# Patient Record
Sex: MALE | Race: WHITE | NOT HISPANIC OR LATINO | URBAN - METROPOLITAN AREA
[De-identification: names, ages, dates, MRNs, and addresses within clinical notes are randomized per-mention and may not be internally consistent; named-entity substitution may affect disease eponyms.]

---

## 2018-04-25 ENCOUNTER — TRANSFERRED RECORDS (OUTPATIENT)
Dept: HEALTH INFORMATION MANAGEMENT | Facility: CLINIC | Age: 23
End: 2018-04-25

## 2018-04-25 ENCOUNTER — HOSPITAL ENCOUNTER (EMERGENCY)
Facility: CLINIC | Age: 23
Discharge: HOME | End: 2018-04-25
Attending: EMERGENCY MEDICINE | Admitting: EMERGENCY MEDICINE
Payer: COMMERCIAL

## 2018-04-25 VITALS
OXYGEN SATURATION: 97 % | WEIGHT: 138 LBS | BODY MASS INDEX: 20.44 KG/M2 | SYSTOLIC BLOOD PRESSURE: 144 MMHG | RESPIRATION RATE: 16 BRPM | HEART RATE: 64 BPM | HEIGHT: 69 IN | TEMPERATURE: 97.8 F | DIASTOLIC BLOOD PRESSURE: 95 MMHG

## 2018-04-25 DIAGNOSIS — I82.4Z1 ACUTE DEEP VEIN THROMBOSIS (DVT) OF DISTAL VEIN OF RIGHT LOWER EXTREMITY (H): ICD-10-CM

## 2018-04-25 LAB
ANION GAP SERPL CALCULATED.3IONS-SCNC: 4 MMOL/L (ref 3–14)
APTT PPP: 26 SEC (ref 22–37)
BASOPHILS # BLD AUTO: 0 10E9/L (ref 0–0.2)
BASOPHILS NFR BLD AUTO: 0.5 %
BUN SERPL-MCNC: 13 MG/DL (ref 7–30)
CALCIUM SERPL-MCNC: 9.1 MG/DL (ref 8.5–10.1)
CHLORIDE SERPL-SCNC: 107 MMOL/L (ref 94–109)
CO2 SERPL-SCNC: 28 MMOL/L (ref 20–32)
CREAT SERPL-MCNC: 0.99 MG/DL (ref 0.66–1.25)
DIFFERENTIAL METHOD BLD: NORMAL
EOSINOPHIL # BLD AUTO: 0.5 10E9/L (ref 0–0.7)
EOSINOPHIL NFR BLD AUTO: 7.5 %
ERYTHROCYTE [DISTWIDTH] IN BLOOD BY AUTOMATED COUNT: 12 % (ref 10–15)
GFR SERPL CREATININE-BSD FRML MDRD: >90 ML/MIN/1.7M2
GLUCOSE SERPL-MCNC: 84 MG/DL (ref 70–99)
HCT VFR BLD AUTO: 42.3 % (ref 40–53)
HGB BLD-MCNC: 15.2 G/DL (ref 13.3–17.7)
IMM GRANULOCYTES # BLD: 0 10E9/L (ref 0–0.4)
IMM GRANULOCYTES NFR BLD: 0.2 %
INR PPP: 1.08 (ref 0.86–1.14)
LYMPHOCYTES # BLD AUTO: 1.8 10E9/L (ref 0.8–5.3)
LYMPHOCYTES NFR BLD AUTO: 29.6 %
MCH RBC QN AUTO: 30.9 PG (ref 26.5–33)
MCHC RBC AUTO-ENTMCNC: 35.9 G/DL (ref 31.5–36.5)
MCV RBC AUTO: 86 FL (ref 78–100)
MONOCYTES # BLD AUTO: 0.3 10E9/L (ref 0–1.3)
MONOCYTES NFR BLD AUTO: 5.7 %
NEUTROPHILS # BLD AUTO: 3.4 10E9/L (ref 1.6–8.3)
NEUTROPHILS NFR BLD AUTO: 56.5 %
NRBC # BLD AUTO: 0 10*3/UL
NRBC BLD AUTO-RTO: 0 /100
PLATELET # BLD AUTO: 246 10E9/L (ref 150–450)
POTASSIUM SERPL-SCNC: 4.1 MMOL/L (ref 3.4–5.3)
RBC # BLD AUTO: 4.92 10E12/L (ref 4.4–5.9)
SODIUM SERPL-SCNC: 139 MMOL/L (ref 133–144)
WBC # BLD AUTO: 6 10E9/L (ref 4–11)

## 2018-04-25 PROCEDURE — 99283 EMERGENCY DEPT VISIT LOW MDM: CPT

## 2018-04-25 PROCEDURE — 85730 THROMBOPLASTIN TIME PARTIAL: CPT | Performed by: EMERGENCY MEDICINE

## 2018-04-25 PROCEDURE — 85610 PROTHROMBIN TIME: CPT | Performed by: EMERGENCY MEDICINE

## 2018-04-25 PROCEDURE — 85025 COMPLETE CBC W/AUTO DIFF WBC: CPT | Performed by: EMERGENCY MEDICINE

## 2018-04-25 PROCEDURE — 80048 BASIC METABOLIC PNL TOTAL CA: CPT | Performed by: EMERGENCY MEDICINE

## 2018-04-25 ASSESSMENT — ENCOUNTER SYMPTOMS: SHORTNESS OF BREATH: 0

## 2018-04-25 NOTE — ED AVS SNAPSHOT
Emergency Department    [address] [place] [address]    Phone:  [phone]    Fax:  [phone]                                       Otis Brewster   MRN: [id number]    Department:   Emergency Department   Date of Visit:  4/25/2018           After Visit Summary Signature Page     I have received my discharge instructions, and my questions have been answered. I have discussed any challenges I see with this plan with the nurse or doctor.    ..........................................................................................................................................  Patient/Patient Representative Signature      ..........................................................................................................................................  Patient Representative Print Name and Relationship to Patient    ..................................................               ................................................  Date                                            Time    ..........................................................................................................................................  Reviewed by Signature/Title    ...................................................              ..............................................  Date                                                            Time

## 2018-04-25 NOTE — DISCHARGE INSTRUCTIONS
* Deep Vein Thrombosis    Deep Vein Thrombosis (DVT) means there is a blood clot in a deep vein of the leg. This may cause redness, swelling, warmth and pain of the leg. If the blood clot grows larger, a piece may break off and go to the lungs (pulmonary embolus)  Factors that increase risk of a DVT include: overweight, smoking, use of estrogen replacement therapy. Prolonged periods without movement (such as long distance travel, wearing a fracture cast, and prolonged bed rest after surgery or during an illness) also increases the risk of a DVT.  Home Care:    Wear compression stockings as directed by your doctor.    Move your ankles, toes and knees frequently to stimulate blood flow.    You will be prescribed a blood-thinning (anti-coagulant) medicine, either pills or shots. Take it exactly as directed.  Follow Up  Follow up with your doctor as advised. You will need regular blood tests to check your INR (International Normalized Ratio).  Get Prompt Medical Attention  Call your doctor or 911 if any of the following occur:    Shortness of breath or painful breathing    Chest pain, repeated cough or coughing up blood    Increasing swelling or increasing pain in the leg    Spreading redness    Unexpected bleeding (nose, gums, cuts, urinary tract, vagina, rectum)    6491-0607 [employer]. [address] [place] [address], [place] [address]. All rights reserved. This information is not intended as a substitute for professional medical care. Always follow your healthcare professional's instructions.  This information has been modified by your health care provider with permission from the publisher.

## 2018-04-25 NOTE — ED AVS SNAPSHOT
Emergency Department    [address] [facility] [address]    Phone:  [phone]    Fax:  [phone]                                       Otis Brewster   MRN: [id number]    Department:   Emergency Department   Date of Visit:  4/25/2018           Patient Information     Date Of Birth          1995        Your diagnoses for this visit were:     Acute deep vein thrombosis (DVT) of distal vein of right lower extremity (H)        You were seen by Lsieth Delgado MD.      Follow-up Information     Follow up with Follow-up with your Primary Doctor. Please call to schedule an appointment..        Follow up with  Emergency Department.    Specialty:  EMERGENCY MEDICINE    Why:  As needed, If symptoms worsen    Contact information:    [address] [facility] [address] [phone]        Discharge Instructions         * Deep Vein Thrombosis    Deep Vein Thrombosis (DVT) means there is a blood clot in a deep vein of the leg. This may cause redness, swelling, warmth and pain of the leg. If the blood clot grows larger, a piece may break off and go to the lungs (pulmonary embolus)  Factors that increase risk of a DVT include: overweight, smoking, use of estrogen replacement therapy. Prolonged periods without movement (such as long distance travel, wearing a fracture cast, and prolonged bed rest after surgery or during an illness) also increases the risk of a DVT.  Home Care:    Wear compression stockings as directed by your doctor.    Move your ankles, toes and knees frequently to stimulate blood flow.    You will be prescribed a blood-thinning (anti-coagulant) medicine, either pills or shots. Take it exactly as directed.  Follow Up  Follow up with your doctor as advised. You will need regular blood tests to check your INR (International Normalized Ratio).  Get Prompt Medical Attention  Call your doctor or 911 if any of the following occur:    Shortness of  breath or painful breathing    Chest pain, repeated cough or coughing up blood    Increasing swelling or increasing pain in the leg    Spreading redness    Unexpected bleeding (nose, gums, cuts, urinary tract, vagina, rectum)    1543-6285 [employer] [address] [place] [address], [place] [address]. All rights reserved. This information is not intended as a substitute for professional medical care. Always follow your healthcare professional's instructions.  This information has been modified by your health care provider with permission from the publisher.          24 Hour Appointment Hotline       To make an appointment at any [facility] call [phone] ([phone]). If you don't have a family doctor or clinic, we will help you find one. [facility] clinics are conveniently located to serve the needs of you and your family.             Review of your medicines      START taking        Dose / Directions Last dose taken    * rivaroxaban ANTICOAGULANT 15 MG Tabs tablet   Commonly known as:  XARELTO   Dose:  15 mg   Quantity:  42 tablet        Take 1 tablet (15 mg) by mouth 2 times daily (with meals) for 21 days (start with this dose)   Refills:  0        * rivaroxaban ANTICOAGULANT 20 MG Tabs tablet   Commonly known as:  XARELTO   Dose:  20 mg   Quantity:  30 tablet   Start taking on:  5/16/2018        Take 1 tablet (20 mg) by mouth daily (with dinner) (start this dose AFTER you have completed the 21 days of 15mg tablets twice daily)   Refills:  1        * Notice:  This list has 2 medication(s) that are the same as other medications prescribed for you. Read the directions carefully, and ask your doctor or other care provider to review them with you.            Prescriptions were sent or printed at these locations (2 Prescriptions)                   Other Prescriptions                Printed at Department/Unit printer (2 of 2)         rivaroxaban ANTICOAGULANT (XARELTO) 15 MG TABS tablet                rivaroxaban ANTICOAGULANT (XARELTO) 20 MG TABS tablet                Procedures and tests performed during your visit     Basic metabolic panel    CBC with platelets differential    INR    PTT      Orders Needing Specimen Collection     None      Pending Results     No orders found from 4/23/2018 to 4/26/2018.            Pending Culture Results     No orders found from 4/23/2018 to 4/26/2018.            Pending Results Instructions     If you had any lab results that were not finalized at the time of your Discharge, you can call the ED Lab Result RN at [phone]. You will be contacted by this team for any positive Lab results or changes in treatment. The nurses are available 7 days a week from 10A to 6:30P.  You can leave a message 24 hours per day and they will return your call.        Test Results From Your Hospital Stay        4/25/2018  1:14 PM      Component Results     Component Value Ref Range & Units Status    WBC 6.0 4.0 - 11.0 10e9/L Final    RBC Count 4.92 4.4 - 5.9 10e12/L Final    Hemoglobin 15.2 13.3 - 17.7 g/dL Final    Hematocrit 42.3 40.0 - 53.0 % Final    MCV 86 78 - 100 fl Final    MCH 30.9 26.5 - 33.0 pg Final    MCHC 35.9 31.5 - 36.5 g/dL Final    RDW 12.0 10.0 - 15.0 % Final    Platelet Count 246 150 - 450 10e9/L Final    Diff Method Automated Method  Final    % Neutrophils 56.5 % Final    % Lymphocytes 29.6 % Final    % Monocytes 5.7 % Final    % Eosinophils 7.5 % Final    % Basophils 0.5 % Final    % Immature Granulocytes 0.2 % Final    Nucleated RBCs 0 0 /100 Final    Absolute Neutrophil 3.4 1.6 - 8.3 10e9/L Final    Absolute Lymphocytes 1.8 0.8 - 5.3 10e9/L Final    Absolute Monocytes 0.3 0.0 - 1.3 10e9/L Final    Absolute Eosinophils 0.5 0.0 - 0.7 10e9/L Final    Absolute Basophils 0.0 0.0 - 0.2 10e9/L Final    Abs Immature Granulocytes 0.0 0 - 0.4 10e9/L Final    Absolute Nucleated RBC 0.0  Final         4/25/2018  1:31 PM      Component Results     Component Value Ref Range & Units  Status    PTT 26 22 - 37 sec Final         4/25/2018  1:31 PM      Component Results     Component Value Ref Range & Units Status    INR 1.08 0.86 - 1.14 Final         4/25/2018  1:30 PM      Component Results     Component Value Ref Range & Units Status    Sodium 139 133 - 144 mmol/L Final    Potassium 4.1 3.4 - 5.3 mmol/L Final    Chloride 107 94 - 109 mmol/L Final    Carbon Dioxide 28 20 - 32 mmol/L Final    Anion Gap 4 3 - 14 mmol/L Final    Glucose 84 70 - 99 mg/dL Final    Urea Nitrogen 13 7 - 30 mg/dL Final    Creatinine 0.99 0.66 - 1.25 mg/dL Final    GFR Estimate >90 >60 mL/min/1.7m2 Final    Non  GFR Calc    GFR Estimate If Black >90 >60 mL/min/1.7m2 Final    African American GFR Calc    Calcium 9.1 8.5 - 10.1 mg/dL Final                Clinical Quality Measure: Blood Pressure Screening     Your blood pressure was checked while you were in the emergency department today. The last reading we obtained was  BP: (!) 144/95 . Please read the guidelines below about what these numbers mean and what you should do about them.  If your systolic blood pressure (the top number) is less than 120 and your diastolic blood pressure (the bottom number) is less than 80, then your blood pressure is normal. There is nothing more that you need to do about it.  If your systolic blood pressure (the top number) is 120-139 or your diastolic blood pressure (the bottom number) is 80-89, your blood pressure may be higher than it should be. You should have your blood pressure rechecked within a year by a primary care provider.  If your systolic blood pressure (the top number) is 140 or greater or your diastolic blood pressure (the bottom number) is 90 or greater, you may have high blood pressure. High blood pressure is treatable, but if left untreated over time it can put you at risk for heart attack, stroke, or kidney failure. You should have your blood pressure rechecked by a primary care provider within the next 4  "weeks.  If your provider in the emergency department today gave you specific instructions to follow-up with your doctor or provider even sooner than that, you should follow that instruction and not wait for up to 4 weeks for your follow-up visit.        Thank you for choosing [facility]       Thank you for choosing [facility] for your care. Our goal is always to provide you with excellent care. Hearing back from our patients is one way we can continue to improve our services. Please take a few minutes to complete the written survey that you may receive in the mail after you visit with us. Thank you!        [facility] Information     [facility] lets you send messages to your doctor, view your test results, renew your prescriptions, schedule appointments and more. To sign up, go to [email or web address] . Click on \"Log in\" on the left side of the screen, which will take you to the Welcome page. Then click on \"Sign up Now\" on the right side of the page.     You will be asked to enter the access code listed below, as well as some personal information. Please follow the directions to create your username and password.     Your access code is: [email or web address]  Expires: 2018  1:52 PM     Your access code will  in 90 days. If you need help or a new code, please call your [facility] clinic or [phone].        Care EveryWhere ID     This is your Care EveryWhere ID. This could be used by other organizations to access your [facility] medical records  [id number]        Equal Access to Services     MACKENZIE CORMIER AH: Hadii suzanne Tidwell, waaxda domo, qaybta kaalmada wei, vinny jones. So [facility] [phone].    ATENCIÓN: Si habla español, tiene a gaines disposición servicios gratuitos de asistencia lingüística. Bhavesh al [phone].    We comply with applicable federal civil rights laws and [place] laws. We do not discriminate on the basis of race, color, national origin, age, " disability, sex, sexual orientation, or gender identity.            After Visit Summary       This is your record. Keep this with you and show to your community pharmacist(s) and doctor(s) at your next visit.

## 2018-04-25 NOTE — ED PROVIDER NOTES
"  History     Chief Complaint:  Leg Swelling       The history is provided by the patient.      Otis Brewster is a 22 year old male who presents to the ED for evaluation of leg swelling. The patient's painful right leg swelling started in 3/18 and has generally progressed since then. Today at [facility] in [place], he received a positive imaging for DVT.     Here in the ED, he reports a 4 hour flight to [facility] in 3/18. He denies chest pain, shortness of breath, family history of clots, or recent cancers, surgeries, or trauma to his right leg.     US Lower extremity venous duplex limited or unilateral right ([facility] 1129):  Conclusion:   1. Extensive acute deep vein thrombosis right lower extremity extending from right common femoral vein level through peroneal and posterior tibial vein levels. As per radiology.     Allergies:  NKDA     Medications:    The patient is not currently taking any prescribed medications.    Past Medical History:    The patient denies any significant past medical history.    Past Surgical History:    The patient does not have any pertinent past surgical history.    Family History:    No past pertinent family history.    Social History:  Presents alone.  Recent travel.     Review of Systems   Respiratory: Negative for shortness of breath.    Cardiovascular: Positive for leg swelling. Negative for chest pain.   All other systems reviewed and are negative.      Physical Exam   First Vitals:  BP: (!) 144/95  Pulse: 64  Temp: 97.8  F (36.6  C)  Resp: 16  Height: 175.3 cm (5' 9\")  Weight: 62.6 kg (138 lb)  SpO2: 97 %      Physical Exam  General/Appearance: appears stated age, well-groomed, appears comfortable  Eyes: EOMI, no scleral injection, no icterus  ENT: MMM  Neck: supple, nl ROM, no stiffness  Cardiovascular: RRR, nl S1S2, no m/r/g, 2+ pulses in all 4 extremities, cap refill <2sec  Respiratory: CTAB, good air movement throughout, no wheezes/rhonchi/rales, no increased WOB, " no retractions  Back: no lesions  GI: abd soft, non-distended, nttp,  no HSM, no rebound, no guarding, nl BS  MSK: HARDY, good tone, no bony abnormality, R calf larger than left  Skin: warm and well-perfused, no rash, no edema, no ecchymosis, nl turgor  Neuro: GCS 15, alert and oriented, no gross focal neuro deficits  Psych: interacts appropriately  Heme: no petechia, no purpura, no active bleeding        Emergency Department Course   Laboratory:  INR: 1.08  PTT: 26    CBC: WBC: 6.0, HGB: 15.2, PLT: 246  BMP: Glucose 84, o/w WNL (Creatinine: 0.99)    Emergency Department Course:  Nursing notes and vitals reviewed. 1255 I performed an exam of the patient as documented above.     Blood drawn. This was sent to the lab for further testing, results above.    1401 I rechecked the patient and discussed the results of his workup thus far.     Findings and plan explained to the Patient. Patient discharged home with instructions regarding supportive care, medications, and reasons to return. The importance of close follow-up was reviewed. The patient was prescribed Xarelto.     I personally reviewed the laboratory results with the Patient and answered all related questions prior to discharge.     Impression & Plan    Medical Decision Making:  Otis Brewster is a 22 year old male who presents with outpatient diagnosis of DVT.  His only risk factor, that is known, is a recent trip to [facility].  He denies any chest pain, shortness of breath, other symptoms to suggest propagation of the clot out of the lower extremity.  We will start him on Xarelto.  Baseline labs are obtained without any significant platelet her hemoglobin abnormality.  I have asked him to follow-up with his PCP.    Diagnosis:    ICD-10-CM   1. Acute deep vein thrombosis (DVT) of distal vein of right lower extremity (H) I82.4Z1       Disposition:  discharged to home    Discharge Medications:  New Prescriptions    RIVAROXABAN ANTICOAGULANT (XARELTO) 15 MG TABS  TABLET    Take 1 tablet (15 mg) by mouth 2 times daily (with meals) for 21 days (start with this dose)    RIVAROXABAN ANTICOAGULANT (XARELTO) 20 MG TABS TABLET    Take 1 tablet (20 mg) by mouth daily (with dinner) (start this dose AFTER you have completed the 21 days of 15mg tablets twice daily)     I, Sophie Maher, am serving as a scribe on 4/25/2018 at 12:55 PM to personally document services performed by Liseth Delgado* based on my observations and the provider's statements to me.       Sophie Maher  4/25/2018    EMERGENCY DEPARTMENT       Liseth Delgado MD  04/25/18 [id number]

## 2018-05-31 ENCOUNTER — OFFICE VISIT (OUTPATIENT)
Dept: FAMILY MEDICINE | Facility: CLINIC | Age: 23
End: 2018-05-31
Payer: COMMERCIAL

## 2018-05-31 VITALS
BODY MASS INDEX: 20.23 KG/M2 | OXYGEN SATURATION: 99 % | SYSTOLIC BLOOD PRESSURE: 135 MMHG | DIASTOLIC BLOOD PRESSURE: 87 MMHG | TEMPERATURE: 97.5 F | WEIGHT: 137 LBS | HEART RATE: 52 BPM

## 2018-05-31 DIAGNOSIS — I82.4Y1 ACUTE DEEP VEIN THROMBOSIS (DVT) OF PROXIMAL VEIN OF RIGHT LOWER EXTREMITY (H): Primary | ICD-10-CM

## 2018-05-31 PROCEDURE — 99203 OFFICE O/P NEW LOW 30 MIN: CPT | Performed by: FAMILY MEDICINE

## 2018-05-31 NOTE — PROGRESS NOTES
HPI      ROS      Physical Exam      SUBJECTIVE:   Otis Brewster is a 22 year old male who presents to clinic today for the following health issues:    Right calf swelling and pain started in March and gradually worsened; he had an US at [facility] that showed extensive clot and he presented to ER 4/25/18 and was started on Xarelto.  He had actually had several trips with air travel during the preceding week(s).  No other risk factor such as surgery, malignancy, or known family h/o clotting disorder.  His mom is an anesthesiologist and is on the phone and mentions that she is adopted and does not know a lot of her family history.  He is doing well, no pain in the leg, minimal if any swelling.  He reports excellent compliance with xarelto, no s/e.  He has questions about further evaluation.            Problem list and histories reviewed & adjusted, as indicated.  Additional history: as documented    There is no problem list on file for this patient.    History reviewed. No pertinent surgical history.    Social History   Substance Use Topics     Smoking status: Never Smoker     Smokeless tobacco: Never Used     Alcohol use Yes      Comment: moderate     History reviewed. No pertinent family history.      Current Outpatient Prescriptions   Medication Sig Dispense Refill     rivaroxaban ANTICOAGULANT (XARELTO) 20 MG TABS tablet Take 1 tablet (20 mg) by mouth daily (with dinner) 30 tablet 1     [DISCONTINUED] rivaroxaban ANTICOAGULANT (XARELTO) 20 MG TABS tablet Take 1 tablet (20 mg) by mouth daily (with dinner) (start this dose AFTER you have completed the 21 days of 15mg tablets twice daily) 30 tablet 1     No Known Allergies    Reviewed and updated as needed this visit by clinical staff  Tobacco  Allergies  Meds  Med Hx  Surg Hx  Fam Hx  Soc Hx      Reviewed and updated as needed this visit by Provider         ROS:  Constitutional, HEENT, cardiovascular, pulmonary, GI, , musculoskeletal, neuro, skin, endocrine  and psych systems are negative, except as otherwise noted.    OBJECTIVE:     /87 (BP Location: Right arm, Patient Position: Sitting, Cuff Size: Adult Regular)  Pulse 52  Temp 97.5  F (36.4  C) (Oral)  Wt 137 lb (62.1 kg)  SpO2 99%  BMI 20.23 kg/m2  Body mass index is 20.23 kg/(m^2).  GENERAL: healthy, alert and no distress  EYES: Eyes grossly normal to inspection, PERRL and conjunctivae and sclerae normal  NECK: no adenopathy, no asymmetry, masses, or scars and thyroid normal to palpation  RESP: lungs clear to auscultation - no rales, rhonchi or wheezes  CV: regular rate and rhythm, normal S1 S2, no S3 or S4, no murmur, click or rub, no peripheral edema and peripheral pulses strong  ABDOMEN: soft, nontender, no hepatosplenomegaly, no masses and bowel sounds normal  MS: minimal edema to right lower leg, no erythema or tenderness.  SKIN: no suspicious lesions or rashes  NEURO: Normal strength and tone, mentation intact and speech normal  PSYCH: mentation appears normal, affect normal/bright    Diagnostic Test Results:  none     ASSESSMENT/PLAN:     DVT: provoked with minor risk factor of air travel, and some unknown family history, plan is to continue anticoagulation for 3 months and consider thrombophilia work up.  Discussed it is not usually necessary to repeat US unless he develops symptoms or misses doses.      Ramandeep Hanson MD  [facility]

## 2018-05-31 NOTE — MR AVS SNAPSHOT
"              After Visit Summary   2018    Otis Brewster    MRN: [id number]           Patient Information     Date Of Birth          1995        Visit Information        Provider Department      2018 9:20 AM Ramandeep Hanson MD [facility]        Today's Diagnoses     Acute deep vein thrombosis (DVT) of proximal vein of right lower extremity (H)    -  1       Follow-ups after your visit        Who to contact     If you have questions or need follow up information about today's clinic visit or your schedule please contact [facility] directly at [phone].  Normal or non-critical lab and imaging results will be communicated to you by Comply[employer], letter or phone within 4 business days after the clinic has received the results. If you do not hear from us within 7 days, please contact the clinic through ComplyMD[employer] or phone. If you have a critical or abnormal lab result, we will notify you by phone as soon as possible.  Submit refill requests through [employer] or call your pharmacy and they will forward the refill request to us. Please allow 3 business days for your refill to be completed.          Additional Information About Your Visit        MyChart Information     [employer] lets you send messages to your doctor, view your test results, renew your prescriptions, schedule appointments and more. To sign up, go to [email or web address] . Click on \"Log in\" on the left side of the screen, which will take you to the Welcome page. Then click on \"Sign up Now\" on the right side of the page.     You will be asked to enter the access code listed below, as well as some personal information. Please follow the directions to create your username and password.     Your access code is: [email or web address]  Expires: 2018  1:52 PM     Your access code will  in 90 days. If you need help or a new code, please call your [facility] or [phone].        Care EveryWhere ID "     This is your Care EveryWhere ID. This could be used by other organizations to access your [facility] medical records  [id number]        Your Vitals Were     Pulse Temperature Pulse Oximetry BMI (Body Mass Index)          52 97.5  F (36.4  C) (Oral) 99% 20.23 kg/m2         Blood Pressure from Last 3 Encounters:   05/31/18 135/87   04/25/18 (!) 144/95    Weight from Last 3 Encounters:   05/31/18 137 lb (62.1 kg)   04/25/18 138 lb (62.6 kg)              Today, you had the following     No orders found for display         Today's Medication Changes          These changes are accurate as of 5/31/18 11:59 PM.  If you have any questions, ask your nurse or doctor.               These medicines have changed or have updated prescriptions.        Dose/Directions    rivaroxaban ANTICOAGULANT 20 MG Tabs tablet   Commonly known as:  XARELTO   This may have changed:  additional instructions   Used for:  Acute deep vein thrombosis (DVT) of proximal vein of right lower extremity (H)   Changed by:  Ramandeep Hanson MD        Dose:  20 mg   Take 1 tablet (20 mg) by mouth daily (with dinner)   Quantity:  30 tablet   Refills:  1            Where to get your medicines      These medications were sent to [place] [address] [place] [address]     Phone:  [phone]     rivaroxaban ANTICOAGULANT 20 MG Tabs tablet                Primary Care Provider Fax #    Physician No Ref-Primary [phone]       No address on file        Equal Access to Services     MACKENZIE CORMIER AH: Hadii suzanne Tidwell, kerri oliveros, qajoel kaalvinny vidal. So [facility] [phone].    ATENCIÓN: Si habla español, tiene a gaines disposición servicios gratuitos de asistencia lingüística. Llame al [phone].    We comply with applicable federal civil rights laws and [place] laws. We do not discriminate on the basis of race, color, national  origin, age, disability, sex, sexual orientation, or gender identity.            Thank you!     Thank you for choosing [facility]  for your care. Our goal is always to provide you with excellent care. Hearing back from our patients is one way we can continue to improve our services. Please take a few minutes to complete the written survey that you may receive in the mail after your visit with us. Thank you!             Your Updated Medication List - Protect others around you: Learn how to safely use, store and throw away your medicines at www.disposemymeds.org.          This list is accurate as of 5/31/18 11:59 PM.  Always use your most recent med list.                   Brand Name Dispense Instructions for use Diagnosis    rivaroxaban ANTICOAGULANT 20 MG Tabs tablet    XARELTO    30 tablet    Take 1 tablet (20 mg) by mouth daily (with dinner)    Acute deep vein thrombosis (DVT) of proximal vein of right lower extremity (H)

## 2022-02-28 ENCOUNTER — TELEPHONE (OUTPATIENT)
Dept: HEMATOLOGY | Facility: CLINIC | Age: 27
End: 2022-02-28
Payer: COMMERCIAL

## 2022-03-10 ENCOUNTER — TELEPHONE (OUTPATIENT)
Dept: HEMATOLOGY | Facility: CLINIC | Age: 27
End: 2022-03-10
Payer: COMMERCIAL

## 2022-03-10 NOTE — TELEPHONE ENCOUNTER
Reached out today to Otis STIVEN Ney in anticipation of his visit with Dr. Aida Medina on 3/16/2022.  Joaquim says he was seem by a hematologist in 2017 or 2018 at a clinic in [place], [place].  He cannot remember the name of the clinic or the provider, but will reach out to his mother for this information and give a call back.  He did give verbal approval for our team to access those records via care everywhere.    Cheyenne CASTRO, RN   Nurse Clinician  [facility]  Office: [phone]  Main Office: [phone] (ask to speak with a nurse)  Fax: [phone]     Otis called back after speaking with his mother and provided the following clarification. Joaquim was seen by [facility] in [place], [place] when he was having left pain in 2017/2018.  Clotting was not suspected at that time; the only diagnostic studies done were a Tib/Fib xray.  The provider they saw there was Pratibha Mcintosh.    Otis was see by a Hematologist in March 2020 Dr. Alban Scherer at [facility].  His primary care in [place] was Dr. Sharla Swift.  Otis gave verbal permission to get records from those facilities through Care Everywhere.    Cheyenne CASTRO, RN   Nurse Clinician  [facility]  Office: [phone]  Main Office: [phone] (ask to speak with a nurse)  Fax: [phone]

## 2022-03-14 NOTE — CONFIDENTIAL NOTE
Need to get records.     Anabela Quinteros  MSN, RN, PHN  [facility] for Bleeding [facility] Clot[facility]  Office: [phone]  Fax: [phone]

## 2022-03-16 ENCOUNTER — OFFICE VISIT (OUTPATIENT)
Dept: HEMATOLOGY | Facility: CLINIC | Age: 27
End: 2022-03-16
Attending: PHYSICIAN ASSISTANT
Payer: COMMERCIAL

## 2022-03-16 VITALS
OXYGEN SATURATION: 99 % | SYSTOLIC BLOOD PRESSURE: 137 MMHG | DIASTOLIC BLOOD PRESSURE: 80 MMHG | RESPIRATION RATE: 16 BRPM | WEIGHT: 149 LBS | TEMPERATURE: 97.4 F | HEIGHT: 68 IN | BODY MASS INDEX: 22.58 KG/M2 | HEART RATE: 60 BPM

## 2022-03-16 DIAGNOSIS — D68.51 HOMOZYGOUS FACTOR V LEIDEN MUTATION (H): Primary | ICD-10-CM

## 2022-03-16 DIAGNOSIS — Z86.718 HISTORY OF VENOUS THROMBOEMBOLISM: ICD-10-CM

## 2022-03-16 DIAGNOSIS — Z86.718 HISTORY OF DEEP VENOUS THROMBOSIS (DVT) OF DISTAL VEIN OF LEFT LOWER EXTREMITY: ICD-10-CM

## 2022-03-16 LAB
ALBUMIN SERPL-MCNC: 4.5 G/DL (ref 3.4–5)
ALP SERPL-CCNC: 88 U/L (ref 40–150)
ALT SERPL W P-5'-P-CCNC: 27 U/L (ref 0–70)
ANION GAP SERPL CALCULATED.3IONS-SCNC: 4 MMOL/L (ref 3–14)
AST SERPL W P-5'-P-CCNC: 11 U/L (ref 0–45)
BASOPHILS # BLD AUTO: 0.1 10E3/UL (ref 0–0.2)
BASOPHILS NFR BLD AUTO: 1 %
BILIRUB SERPL-MCNC: 0.8 MG/DL (ref 0.2–1.3)
BUN SERPL-MCNC: 13 MG/DL (ref 7–30)
CALCIUM SERPL-MCNC: 9.9 MG/DL (ref 8.5–10.1)
CHLORIDE BLD-SCNC: 108 MMOL/L (ref 94–109)
CO2 SERPL-SCNC: 27 MMOL/L (ref 20–32)
CREAT SERPL-MCNC: 1.02 MG/DL (ref 0.66–1.25)
EOSINOPHIL # BLD AUTO: 0.6 10E3/UL (ref 0–0.7)
EOSINOPHIL NFR BLD AUTO: 10 %
ERYTHROCYTE [DISTWIDTH] IN BLOOD BY AUTOMATED COUNT: 11.9 % (ref 10–15)
GFR SERPL CREATININE-BSD FRML MDRD: >90 ML/MIN/1.73M2
GLUCOSE BLD-MCNC: 92 MG/DL (ref 70–99)
HCT VFR BLD AUTO: 47.8 % (ref 40–53)
HGB BLD-MCNC: 17.3 G/DL (ref 13.3–17.7)
IMM GRANULOCYTES # BLD: 0 10E3/UL
IMM GRANULOCYTES NFR BLD: 0 %
LYMPHOCYTES # BLD AUTO: 1.9 10E3/UL (ref 0.8–5.3)
LYMPHOCYTES NFR BLD AUTO: 30 %
MCH RBC QN AUTO: 31.3 PG (ref 26.5–33)
MCHC RBC AUTO-ENTMCNC: 36.2 G/DL (ref 31.5–36.5)
MCV RBC AUTO: 86 FL (ref 78–100)
MONOCYTES # BLD AUTO: 0.4 10E3/UL (ref 0–1.3)
MONOCYTES NFR BLD AUTO: 6 %
NEUTROPHILS # BLD AUTO: 3.2 10E3/UL (ref 1.6–8.3)
NEUTROPHILS NFR BLD AUTO: 53 %
NRBC # BLD AUTO: 0 10E3/UL
NRBC BLD AUTO-RTO: 0 /100
PLATELET # BLD AUTO: 248 10E3/UL (ref 150–450)
POTASSIUM BLD-SCNC: 4.2 MMOL/L (ref 3.4–5.3)
PROT SERPL-MCNC: 7.8 G/DL (ref 6.8–8.8)
RBC # BLD AUTO: 5.53 10E6/UL (ref 4.4–5.9)
SODIUM SERPL-SCNC: 139 MMOL/L (ref 133–144)
WBC # BLD AUTO: 6.2 10E3/UL (ref 4–11)

## 2022-03-16 PROCEDURE — 99204 OFFICE O/P NEW MOD 45 MIN: CPT | Performed by: INTERNAL MEDICINE

## 2022-03-16 PROCEDURE — 85004 AUTOMATED DIFF WBC COUNT: CPT | Performed by: INTERNAL MEDICINE

## 2022-03-16 PROCEDURE — 82040 ASSAY OF SERUM ALBUMIN: CPT | Performed by: INTERNAL MEDICINE

## 2022-03-16 PROCEDURE — G0463 HOSPITAL OUTPT CLINIC VISIT: HCPCS

## 2022-03-16 PROCEDURE — 36415 COLL VENOUS BLD VENIPUNCTURE: CPT | Performed by: INTERNAL MEDICINE

## 2022-03-16 PROCEDURE — 80053 COMPREHEN METABOLIC PANEL: CPT | Performed by: INTERNAL MEDICINE

## 2022-03-16 ASSESSMENT — PAIN SCALES - GENERAL: PAINLEVEL: NO PAIN (0)

## 2022-03-16 NOTE — PROGRESS NOTES
[facility]  [facility] [place] [address]  Phone: [phone], Fax: [phone]    Outpatient Visit Note:    Patient: Otsi Brewster  MRN: [id number]  : 1995  SHAVONNE: Mar 16, 2022    Reason for Consultation:  Homozygous factor V Leiden with history of provoked bilateral thrombosis x2 currently on chronic anticoagualtion    Assessment:  In summary, Otis Brewster is a 26 year old gentleman whom is homozygous for factor V Ledien and has personal history of provoked thromboembolisms x2 currently on chronic anticoagulation with Xarelto. He presents today to establish care with thrombosis specialist.     1. Homozygous Factor V Leiden  2. 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  Ddx 2018. Risk factor(s): 1. Homozygous Factor V Leiden, 2. Air travel,   3. Provoked L Common Femoral Vein through posterior tibial vein VTE. Ddx 2020. Risk factor(s): see above plus 3. History of prior VTE  4. Chronic use of anticoagulation    Otis Brewster presents to clinic today due to personal history of Factor V Leiden. He Does have a personal history of thrombosis. During visit today, I discussed that Factor V Leiden mutation is a common genetic thrombophilia, about 5% of the general population carrying one copy of the mutation. Only about 10% of whom with the mutation would ever develop a thromboembolic event in their lifetime and is usually associated with other provoking factors, such as surgery or taking estrogen/being pregnancy. Joaquim has experienced provoked thrombosis x2, and has ultrasounds with evidence of chronic/residual thrombosis. Today, we discussed the signs and symptoms of post-thrombotic syndrome. Joaquim currenty does NOT have evidence of this. We also discussed that the decision to remain on rivaroxaban (Xarelto) for secondary prophylaxis is reasonable. In the seconday prophylaxis phase, holding medication, such as  Xarelto, to do activiteis (ie skiing) or procedures is reasonable. Discussed that in general, would like to avoid missing more than 2-3 doses. We also discussed the risk of use of alcohol and other agents (NSAIDs), which can increase risk of gastric irritation and risk of bleeding. Overall, the convience of the direct oral anticoagulants (DOACs) is less monitoring, once a day dosing (for Xarelto). Discussed that if cost becomes an issue with the medication, he is also a candidate for apixaban or dabigatran, howevere these drugs are dosed twice a day. Endoxaban would not be ideal as his renal function is too good.     Overall, the recommendation is that individuals on DOACs receive annual CBC with differential and comprehensive metabolic panel to endure their organ function is adequate for the medication and to identify anemia (aka occult bleeding). Joaquim had not had labs recently, so we obtained after the visit. Joaquim, as expected, has normal labs.       Discussed that with his career currently leading to frequent moves, staying in touch annually or at least every 3 years with a coagulation specialist as new guidelines and research can lead to recommendations about if/when anticoagulation can stop. Additionally, there is several newer anticoagulants currently in clinical trials that may be appealing to Otis.       Plan:  1. Majority of today's visit was spent counseling the patient regarding anticoagulation safety.  2. Continue rivaraoxaban 20 mg PO daily  3. Labs annually    The patient is given our center's contact information and is instructed to call if he should have any further questions or concerns.  Otherwise, we will plan on seeing him back Follow up with Provider - 12 months .      Cheyenne Nuñez, nurse clinician is assigned to patient care coordination and education.   Patient understands and agrees with the above plan and recommendation.    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 Kevin Black.     Joaquim reports that he is currently living in [place] due to his employer- [employer]. He previously lived in the [facility] in 2018 for a while, has also lived in [place]. May be relocating to [place] for a period of time in the future.     Joaquim reports that with his first VTE event, he was out for a skiing weekend with friends. He then took a red-eye flight home at which he was doubled over in an ackward position to sleep. When he returned home, he noted right leg tenderness and through he had strained a muscle. Ultimately, when symptoms did not resolve he was evalauted with ultrasound and found to have extensive R LE VTE (see scanned report in Media tab). He was started on Xarelto.      Past Medical History:  Past Medical History:   Diagnosis Date     Chronic anticoagulation      Homozygous Factor V Leiden mutation (H)      Leg DVT (deep venous thromboembolism), acute (H) 04/25/2019    R CFV and lower     Lower leg DVT (deep venous thromboembolism), acute, left (H) 01/04/2020    CFV and lower       Past Surgical History:  No past surgical history on file.    Medications:  Current Outpatient Medications   Medication Sig Dispense Refill     rivaroxaban ANTICOAGULANT (XARELTO) 20 MG TABS tablet Take 1 tablet (20 mg) by mouth daily (with dinner) 30 tablet 1        Allergies:  No Known Allergies    ROS:  Remainder of a comprehensive 14 point ROS is negative unless noted above.    Social History:  Patient works as a construction engineering doing 3D modeling.  Denies any tobacco use. No significant alcohol use. Denies any illicit drug use.     Family  "History  Reviewed- non-contributory    Objectives:  /80 (BP Location: Right arm, Patient Position: Sitting, Cuff Size: Adult Regular)   Pulse 60   Temp 97.4  F (36.3  C) (Oral)   Resp 16   Ht 1.727 m (5' 8\")   Wt 67.6 kg (149 lb)   SpO2 99%   BMI 22.66 kg/m    Exam:   Constitutional: Appears well, no distress  HEENT: Pupils equal and reactive to light. No scleral icterus or hemorrhage. Mask in place due to COVID restrictions  CV: regular rate and rhythm, no murmurs  Respiratory: clear  GI: abdomen soft, nontender, without guarding or rebound. No hepatomeagaly. No splenomegaly.   Mus/Skele: no edema, legs symmetric  Skin: no petechiae, no ecchymosis. Normal hair growth on bilateral LE  Neuro: CN II-XII intact. Normal gait. AOx3  Heme/Lymph: no supraclavicular, axillary or umbilical adenopathy.     Labs:  Genetic testing- Two factor V Leiden alleles detected ([id number], also known as [id number] c.R5385G, p.[id number]). These alleles increase the risk of venous thrombosis.    Results for orders placed or performed in visit on 03/16/22   Comprehensive metabolic panel     Status: Normal   Result Value Ref Range    Sodium 139 133 - 144 mmol/L    Potassium 4.2 3.4 - 5.3 mmol/L    Chloride 108 94 - 109 mmol/L    Carbon Dioxide (CO2) 27 20 - 32 mmol/L    Anion Gap 4 3 - 14 mmol/L    Urea Nitrogen 13 7 - 30 mg/dL    Creatinine 1.02 0.66 - 1.25 mg/dL    Calcium 9.9 8.5 - 10.1 mg/dL    Glucose 92 70 - 99 mg/dL    Alkaline Phosphatase 88 40 - 150 U/L    AST 11 0 - 45 U/L    ALT 27 0 - 70 U/L    Protein Total 7.8 6.8 - 8.8 g/dL    Albumin 4.5 3.4 - 5.0 g/dL    Bilirubin Total 0.8 0.2 - 1.3 mg/dL    GFR Estimate >90 >60 mL/min/1.73m2   CBC with platelets and differential     Status: None   Result Value Ref Range    WBC Count 6.2 4.0 - 11.0 10e3/uL    RBC Count 5.53 4.40 - 5.90 10e6/uL    Hemoglobin 17.3 13.3 - 17.7 g/dL    Hematocrit 47.8 40.0 - 53.0 %    MCV 86 78 - 100 fL    MCH 31.3 26.5 - 33.0 pg    MCHC 36.2 " 31.5 - 36.5 g/dL    RDW 11.9 10.0 - 15.0 %    Platelet Count 248 150 - 450 10e3/uL    % Neutrophils 53 %    % Lymphocytes 30 %    % Monocytes 6 %    % Eosinophils 10 %    % Basophils 1 %    % Immature Granulocytes 0 %    NRBCs per 100 WBC 0 <1 /100    Absolute Neutrophils 3.2 1.6 - 8.3 10e3/uL    Absolute Lymphocytes 1.9 0.8 - 5.3 10e3/uL    Absolute Monocytes 0.4 0.0 - 1.3 10e3/uL    Absolute Eosinophils 0.6 0.0 - 0.7 10e3/uL    Absolute Basophils 0.1 0.0 - 0.2 10e3/uL    Absolute Immature Granulocytes 0.0 <=0.4 10e3/uL    Absolute NRBCs 0.0 10e3/uL   CBC with Platelets & Differential     Status: None    Narrative    The following orders were created for panel order CBC with Platelets & Differential.  Procedure                               Abnormality         Status                     ---------                               -----------         ------                     CBC with platelets and d...[[id number]]                      Final result                 Please view results for these tests on the individual orders.         Imagin ultrasound  Right Lower Extremity:   Femoral vein (one of two) proximal: Chronic non-occlusive deep venous   fibrotic changes   Femoral vein distal: Chronic near-occlusive/possibly occlusive deep venous   fibrotic changes   Popliteal vein proximal-distal: Chronic non-occlusive deep venous fibrotic   changes   Common tibioperoneal trunk vein: Chronic non-occlusive deep venous   fibrotic changes   Peroneal vein proximal: Chronic non-occlusive deep venous fibrotic changes   The remainder of the veins are patent and without evidence of thrombosis     Left Lower Extremity:   No evidence of deep or superficial venous thrombosis       2020  Right Lower Extremity:   Extensive age-indeterminate (possibly acute on chronic) deep venous   thrombosis in the right lower extremity from the proximal thigh to   proximal calf. Please see specifics below.   Femoral vein proximal:  Age-indeterminate near-occlusive deep venous   thrombus   Femoral vein mid to distal: Age-indeterminate non-occlusive deep venous   thrombus   Popliteal vein proximal to distal: Age-indeterminate non-occlusive deep   venous thrombus   Common tibioperoneal trunk vein: Age-indeterminate non-occlusive deep   venous thrombus   The remainder of the veins are patent and without evidence of thrombosis     Left Lower Extremity:   Extensive acute deep venous thrombosis in the left lower extremity from   the proximal thigh to mid calf.  Please see specifics below.   Femoral vein proximal to distal: Acute occlusive deep venous thrombus   Popliteal vein proximal to distal: Acute occlusive deep venous thrombus   Common tibioperoneal trunk vein: Acute occlusive deep venous thrombus   Gastrocnemius veins (multiple pairs) proximal to mid calf: Acute occlusive   deep venous thrombus   Peroneal vein proximal to mid calf: Acute occlusive deep venous thrombus   Posterior tibial vein proximal to mid calf: Acute occlusive deep venous   thrombus   The remainder of the veins are patent and without evidence of thrombosis

## 2022-03-16 NOTE — PATIENT INSTRUCTIONS
[facility]ot[facility]  [address] [place] [address] [address], [place] [address]  Main: [phone], Fax: [phone]    It was a pleasure seeing you today.  Thank you for allowing us to be involved in your care.  Please let us know if there is anything else we can do for you, so that we can be sure you are leaving completely satisfied with your care experience.    Labs today.  Our lab is down the hallway in our clinic space.  We will communicate these to you by Kardium. With your permission we may leave a detailed voicemail, please see below for our contact information should you have any questions about your results. Also, please note that some lab results may take a week or so to get back. Feel free to call or message if you have not heard back from us within 7-10 days.    For your upcoming procedure/surgery, please hold Xarelto for 24 hours prior to your procedure and restart it 12-24 hours after with surgeon approval.    Please stay on your blood thinner:  Xarelto 20 mg daily  Please call us with any bleeding issues that you may have.    Patient Education & Resources:  For additional information, please see the following web links:  www.stoptheclot.org, www.clotconnect.org.    Call the Center for [facility]  at [phone].     -If surgeries or procedures are planned (for holding instructions).     -If off anticoagulation, please call during high risk times (long-distance travel, broken bones or trauma, immobilization, surgery, pregnancy, or taking estrogen).     -Any new symptoms of DVT (deep vein thrombosis) or PE (pulmonary embolism)    -pain     -swelling     -redness    -warmth    -shortness of breath    -chest pain    -coughing up blood    We would like a provider on our team to see you at least annually for optimal care and to allow us to continue to prescribe for you.  Francis Coles PA-C and Catie Hidalgo PA-C are our physician  assistants that are specialized in bleeding and clotting disorders that you may be able to see more readily.    Return to clinic in  12 months.  Please schedule return appointment with Dr. Medina.    Your nurse clinician is Cheyenne Pimentel -[phone].   If they are unavailable and you have immediate concerns, please call [phone] and ask for a nurse.

## 2022-03-17 PROBLEM — Z79.01 CHRONIC ANTICOAGULATION: Status: ACTIVE | Noted: 2022-02-28

## 2022-03-17 PROBLEM — D68.51 FACTOR V LEIDEN MUTATION (H): Status: ACTIVE | Noted: 2020-01-03

## 2022-03-17 PROBLEM — Z86.718 HISTORY OF DEEP VENOUS THROMBOSIS (DVT) OF DISTAL VEIN OF LEFT LOWER EXTREMITY: Status: ACTIVE | Noted: 2022-03-17

## 2022-03-26 ENCOUNTER — HEALTH MAINTENANCE LETTER (OUTPATIENT)
Age: 27
End: 2022-03-26

## 2022-09-18 ENCOUNTER — HEALTH MAINTENANCE LETTER (OUTPATIENT)
Age: 27
End: 2022-09-18

## 2022-12-23 ENCOUNTER — MYC MEDICAL ADVICE (OUTPATIENT)
Dept: HEMATOLOGY | Facility: CLINIC | Age: 27
End: 2022-12-23

## 2022-12-23 DIAGNOSIS — I82.4Y1 ACUTE DEEP VEIN THROMBOSIS (DVT) OF PROXIMAL VEIN OF RIGHT LOWER EXTREMITY (H): ICD-10-CM

## 2023-03-28 ENCOUNTER — MYC MEDICAL ADVICE (OUTPATIENT)
Dept: HEMATOLOGY | Facility: CLINIC | Age: 28
End: 2023-03-28
Payer: COMMERCIAL

## 2023-03-29 NOTE — TELEPHONE ENCOUNTER
Reviewed per Dr. Medina.  Most recent dictation and face sheet was faxed to [facility] at on 3/29/2023 afternoon.      Cheyenne DORADON, RN   Nurse Clinician  [facility] for Bleeding and Clotting Disorders  Office: [phone]  Main Office: [phone] (ask to speak with a nurse)  Fax: [phone]

## 2023-04-24 DIAGNOSIS — I82.4Y1 ACUTE DEEP VEIN THROMBOSIS (DVT) OF PROXIMAL VEIN OF RIGHT LOWER EXTREMITY (H): ICD-10-CM

## 2023-05-06 ENCOUNTER — HEALTH MAINTENANCE LETTER (OUTPATIENT)
Age: 28
End: 2023-05-06

## 2024-07-14 ENCOUNTER — HEALTH MAINTENANCE LETTER (OUTPATIENT)
Age: 29
End: 2024-07-14